# Patient Record
Sex: MALE | Race: WHITE | NOT HISPANIC OR LATINO | Employment: FULL TIME | URBAN - METROPOLITAN AREA
[De-identification: names, ages, dates, MRNs, and addresses within clinical notes are randomized per-mention and may not be internally consistent; named-entity substitution may affect disease eponyms.]

---

## 2022-05-31 ENCOUNTER — OFFICE VISIT (OUTPATIENT)
Dept: FAMILY MEDICINE | Facility: CLINIC | Age: 32
End: 2022-05-31
Payer: COMMERCIAL

## 2022-05-31 VITALS
TEMPERATURE: 98.6 F | BODY MASS INDEX: 24.38 KG/M2 | HEART RATE: 66 BPM | OXYGEN SATURATION: 98 % | SYSTOLIC BLOOD PRESSURE: 124 MMHG | RESPIRATION RATE: 16 BRPM | HEIGHT: 72 IN | WEIGHT: 180 LBS | DIASTOLIC BLOOD PRESSURE: 88 MMHG

## 2022-05-31 DIAGNOSIS — F51.3 SLEEP WALKING: Primary | ICD-10-CM

## 2022-05-31 PROCEDURE — 99203 OFFICE O/P NEW LOW 30 MIN: CPT | Performed by: PHYSICIAN ASSISTANT

## 2022-05-31 ASSESSMENT — PAIN SCALES - GENERAL: PAINLEVEL: NO PAIN (0)

## 2022-05-31 NOTE — PROGRESS NOTES
Assessment & Plan     Sleep walking  Ongoing since childhood, more frequent recently. Recommend follow-up with sleep clinic, referral placed.   - Adult Sleep Eval & Management  Referral; Future    He will return in a few weeks for physical. Declines COVID vaccine.    Return in about 3 months (around 8/31/2022) for Preventive Physical Exam.    TAVIA Hurley [facility] LOUISE Pritchard is a 31 year old who presents for the following health issues  accompanied by his partner.    Healthy Habits:     Taking medications regularly:  0    PHQ-2 Total Score: 0  History of Present Illness       Reason for visit:  Sleep  Symptom onset:  More than a month  Symptoms include:  Sleep walking talking  Symptom intensity:  Mild  Symptom progression:  Staying the same  Had these symptoms before:  Yes  Has tried/received treatment for these symptoms:  No    He eats 2-3 servings of fruits and vegetables daily.He consumes 3 sweetened beverage(s) daily.He exercises with enough effort to increase his heart rate 60 or more minutes per day.  He exercises with enough effort to increase his heart rate 5 days per week.   He is taking medications regularly.     New patient. No routine medical care for many years. Planning to establish care here.    Here with his partner today. She reports that Macho has a long history of sleep walking/sleep talking and it seems to be getting worse. He did sleep walk as a child as well and has had some ongoing sleep walking/talking as an adult. He has no recollection of any events. His partner says that he seems to be having more frequent episodes - sometimes even 3-4 episodes a night. She describes him startling awake, getting out of bed, and often talks about work but it doesn't make sense. Recently, he has pushed her away a few times when she tries to help him back to bed. He has never tried to leave the house or drive during an episode. He does snore but  doesn't ever stop breathing. He feels rested when he wakes up - denies fatigue.    He does not take any medications regularly. Social alcohol use - they have paid attention to see if symptoms correlate with times he drinks alcohol but there does not seem to be any association. No drug use.      Review of Systems   Constitutional, HEENT, cardiovascular, pulmonary, gi and gu systems are negative, except as otherwise noted.      Objective    /88 (BP Location: Right arm, Patient Position: Sitting, Cuff Size: Adult Regular)   Pulse 66   Temp 98.6  F (37  C) (Oral)   Resp 16   Ht 1.829 m (6')   Wt 81.6 kg (180 lb)   SpO2 98%   BMI 24.41 kg/m    Body mass index is 24.41 kg/m .  Physical Exam   GENERAL: healthy, alert and no distress  NECK: no adenopathy, no asymmetry, masses, or scars and thyroid normal to palpation  RESP: lungs clear to auscultation - no rales, rhonchi or wheezes  CV: regular rate and rhythm  NEURO: Normal strength and tone, mentation intact and speech normal  PSYCH: mentation appears normal, affect normal/bright

## 2022-06-22 ENCOUNTER — OFFICE VISIT (OUTPATIENT)
Dept: FAMILY MEDICINE | Facility: CLINIC | Age: 32
End: 2022-06-22
Payer: COMMERCIAL

## 2022-06-22 VITALS
WEIGHT: 180 LBS | SYSTOLIC BLOOD PRESSURE: 100 MMHG | TEMPERATURE: 98.4 F | RESPIRATION RATE: 16 BRPM | HEART RATE: 63 BPM | DIASTOLIC BLOOD PRESSURE: 68 MMHG | BODY MASS INDEX: 24.41 KG/M2

## 2022-06-22 DIAGNOSIS — Z11.4 SCREENING FOR HIV (HUMAN IMMUNODEFICIENCY VIRUS): ICD-10-CM

## 2022-06-22 DIAGNOSIS — Z11.59 NEED FOR HEPATITIS C SCREENING TEST: ICD-10-CM

## 2022-06-22 DIAGNOSIS — Z00.00 ROUTINE GENERAL MEDICAL EXAMINATION AT A HEALTH CARE FACILITY: ICD-10-CM

## 2022-06-22 LAB
ALBUMIN SERPL-MCNC: 4 G/DL (ref 3.4–5)
ALP SERPL-CCNC: 104 U/L (ref 40–150)
ALT SERPL W P-5'-P-CCNC: 101 U/L (ref 0–70)
ANION GAP SERPL CALCULATED.3IONS-SCNC: 4 MMOL/L (ref 3–14)
AST SERPL W P-5'-P-CCNC: 79 U/L (ref 0–45)
BILIRUB SERPL-MCNC: 0.4 MG/DL (ref 0.2–1.3)
BUN SERPL-MCNC: 12 MG/DL (ref 7–30)
CALCIUM SERPL-MCNC: 8.9 MG/DL (ref 8.5–10.1)
CHLORIDE BLD-SCNC: 108 MMOL/L (ref 94–109)
CHOLEST SERPL-MCNC: 188 MG/DL
CO2 SERPL-SCNC: 27 MMOL/L (ref 20–32)
CREAT SERPL-MCNC: 0.96 MG/DL (ref 0.66–1.25)
FASTING STATUS PATIENT QL REPORTED: YES
GFR SERPL CREATININE-BSD FRML MDRD: >90 ML/MIN/1.73M2
GLUCOSE BLD-MCNC: 98 MG/DL (ref 70–99)
HDLC SERPL-MCNC: 41 MG/DL
LDLC SERPL CALC-MCNC: 131 MG/DL
NONHDLC SERPL-MCNC: 147 MG/DL
POTASSIUM BLD-SCNC: 4 MMOL/L (ref 3.4–5.3)
PROT SERPL-MCNC: 7.4 G/DL (ref 6.8–8.8)
SODIUM SERPL-SCNC: 139 MMOL/L (ref 133–144)
TRIGL SERPL-MCNC: 82 MG/DL

## 2022-06-22 PROCEDURE — 36415 COLL VENOUS BLD VENIPUNCTURE: CPT | Performed by: PHYSICIAN ASSISTANT

## 2022-06-22 PROCEDURE — 80061 LIPID PANEL: CPT | Performed by: PHYSICIAN ASSISTANT

## 2022-06-22 PROCEDURE — 80053 COMPREHEN METABOLIC PANEL: CPT | Performed by: PHYSICIAN ASSISTANT

## 2022-06-22 PROCEDURE — 99395 PREV VISIT EST AGE 18-39: CPT | Performed by: PHYSICIAN ASSISTANT

## 2022-06-22 ASSESSMENT — PAIN SCALES - GENERAL: PAINLEVEL: NO PAIN (0)

## 2022-06-22 NOTE — LETTER
"June 23, 2022      Macho Ybarra  [address]  [place] [id number]        Dear ,    We are writing to inform you of your test results.    It was a pleasure to see you in clinic! Here are your recent labs.     Total cholesterol is normal, please continue exercise and watch diet. Triglycerides are normal, this is simple sugar and fat in blood. HDL which is the \"good\" cholesterol (heart protective) is normal, increase this with more exercise. The LDL or \"bad\" cholesterol is a little elevated but does not require medication at this time.     Your metabolic panel reveals normal kidney function and electrolytes. Your glucose is normal. A few of your liver tests are elevated. I would recommend rechecking these in about 3 months. I placed lab orders and you can schedule a lab visit at your convenience. You do not need to be fasting.     If you have any questions or concerns, please do not hesitate to contact me.       Resulted Orders   Lipid panel reflex to direct LDL Fasting   Result Value Ref Range    Cholesterol 188 <200 mg/dL    Triglycerides 82 <150 mg/dL    Direct Measure HDL 41 >=40 mg/dL    LDL Cholesterol Calculated 131 (H) <=100 mg/dL    Non HDL Cholesterol 147 (H) <130 mg/dL    Patient Fasting > 8hrs? Yes     Narrative    Cholesterol  Desirable:  <200 mg/dL    Triglycerides  Normal:  Less than 150 mg/dL  Borderline High:  150-199 mg/dL  High:  200-499 mg/dL  Very High:  Greater than or equal to 500 mg/dL    Direct Measure HDL  Female:  Greater than or equal to 50 mg/dL   Male:  Greater than or equal to 40 mg/dL    LDL Cholesterol  Desirable:  <100mg/dL  Above Desirable:  100-129 mg/dL   Borderline High:  130-159 mg/dL   High:  160-189 mg/dL   Very High:  >= 190 mg/dL    Non HDL Cholesterol  Desirable:  130 mg/dL  Above Desirable:  130-159 mg/dL  Borderline High:  160-189 mg/dL  High:  190-219 mg/dL  Very High:  Greater than or equal to 220 mg/dL   Comprehensive metabolic panel (BMP + Alb, Alk " Phos, ALT, AST, Total. Bili, TP)   Result Value Ref Range    Sodium 139 133 - 144 mmol/L    Potassium 4.0 3.4 - 5.3 mmol/L    Chloride 108 94 - 109 mmol/L    Carbon Dioxide (CO2) 27 20 - 32 mmol/L    Anion Gap 4 3 - 14 mmol/L    Urea Nitrogen 12 7 - 30 mg/dL    Creatinine 0.96 0.66 - 1.25 mg/dL    Calcium 8.9 8.5 - 10.1 mg/dL    Glucose 98 70 - 99 mg/dL    Alkaline Phosphatase 104 40 - 150 U/L    AST 79 (H) 0 - 45 U/L     (H) 0 - 70 U/L    Protein Total 7.4 6.8 - 8.8 g/dL    Albumin 4.0 3.4 - 5.0 g/dL    Bilirubin Total 0.4 0.2 - 1.3 mg/dL    GFR Estimate >90 >60 mL/min/1.73m2      Comment:      Effective December 21, 2021 eGFRcr in adults is calculated using the 2021 CKD-EPI creatinine equation which includes age and gender (Romulo et al., NEJM, DOI: 10.1056/MTHHzz9541334)       If you have any questions or concerns, please call the clinic at the number listed above.       Sincerely,      Belinda Le PA-C

## 2022-06-22 NOTE — PATIENT INSTRUCTIONS
Preventive Health Recommendations  Male Ages 26 - 39    Yearly exam:             See your health care provider every year in order to  o   Review health changes.   o   Discuss preventive care.    o   Review your medicines if your doctor has prescribed any.  You should be tested each year for STDs (sexually transmitted diseases), if you re at risk.   After age 35, talk to your provider about cholesterol testing. If you are at risk for heart disease, have your cholesterol tested at least every 5 years.   If you are at risk for diabetes, you should have a diabetes test (fasting glucose).  Shots: Get a flu shot each year. Get a tetanus shot every 10 years.     Nutrition:  Eat at least 5 servings of fruits and vegetables daily.   Eat whole-grain bread, whole-wheat pasta and brown rice instead of white grains and rice.   Get adequate Calcium and Vitamin D.     Lifestyle  Exercise for at least 150 minutes a week (30 minutes a day, 5 days a week). This will help you control your weight and prevent disease.   Limit alcohol to one drink per day.   No smoking.   Wear sunscreen to prevent skin cancer.   See your dentist every six months for an exam and cleaning.

## 2022-06-22 NOTE — PROGRESS NOTES
SUBJECTIVE:   CC: Macho Ybarra is an 31 year old male who presents for preventative health visit.     Patient has been advised of split billing requirements and indicates understanding: Yes  Healthy Habits:     Getting at least 3 servings of Calcium per day:  Yes    Bi-annual eye exam:  Yes    Dental care twice a year:  NO    Sleep apnea or symptoms of sleep apnea:  None    Diet:  Regular (no restrictions)    Frequency of exercise:  2-3 days/week    Duration of exercise:  Greater than 60 minutes    Taking medications regularly:  Yes    Medication side effects:  Not applicable    PHQ-2 Total Score: 0    Additional concerns today:  No    He has not had a physical in several years. He reports he is doing well. No concerns today.    He has had issues with sleep walking and has visit with sleep clinic scheduled for September.    Today's PHQ-2 Score:   PHQ-2 ( 1999 [employer]) 6/22/2022   Q1: Little interest or pleasure in doing things 0   Q2: Feeling down, depressed or hopeless 0   PHQ-2 Score 0   Q1: Little interest or pleasure in doing things Not at all   Q2: Feeling down, depressed or hopeless Not at all   PHQ-2 Score 0       Abuse: Current or Past(Physical, Sexual or Emotional)- No  Do you feel safe in your environment? Yes    Have you ever done Advance Care Planning? (For example, a Health Directive, POLST, or a discussion with a medical provider or your loved ones about your wishes): No, advance care planning information given to patient to review.  Patient plans to discuss their wishes with loved ones or provider.      Social History     Tobacco Use     Smoking status: Never Smoker     Smokeless tobacco: Current User     Types: Chew   Substance Use Topics     Alcohol use: Yes     Comment: Social     If you drink alcohol do you typically have >3 drinks per day or >7 drinks per week? No    Alcohol Use 6/22/2022   Prescreen: >3 drinks/day or >7 drinks/week? No   Prescreen: >3 drinks/day or >7 drinks/week? -       Last  PSA: No results found for: PSA    Reviewed orders with patient. Reviewed health maintenance and updated orders accordingly - Yes  Lab work is in process  BP Readings from Last 3 Encounters:   06/22/22 100/68   05/31/22 124/88    Wt Readings from Last 3 Encounters:   06/22/22 81.6 kg (180 lb)   05/31/22 81.6 kg (180 lb)                  There is no problem list on file for this patient.    History reviewed. No pertinent surgical history.    Social History     Tobacco Use     Smoking status: Never Smoker     Smokeless tobacco: Current User     Types: Chew   Substance Use Topics     Alcohol use: Yes     Comment: Social     Family History   Problem Relation Age of Onset     Hyperlipidemia Father      Diabetes Type 1 Brother      Colon Cancer No family hx of      Breast Cancer No family hx of      Prostate Cancer No family hx of          No current outpatient medications on file.     Allergies   Allergen Reactions     Seasonal Allergies      No lab results found.     Reviewed and updated as needed this visit by clinical staff   Tobacco  Allergies  Meds   Med Hx  Surg Hx  Fam Hx  Soc Hx          Reviewed and updated as needed this visit by Provider                       Review of Systems  CONSTITUTIONAL: NEGATIVE for fever, chills, change in weight  INTEGUMENTARY/SKIN: NEGATIVE for worrisome rashes, moles or lesions  EYES: NEGATIVE for vision changes or irritation  ENT: NEGATIVE for ear, mouth and throat problems  RESP: NEGATIVE for significant cough or SOB  CV: NEGATIVE for chest pain, palpitations or peripheral edema  GI: NEGATIVE for nausea, abdominal pain, heartburn, or change in bowel habits   male: negative for dysuria, hematuria, decreased urinary stream, erectile dysfunction, urethral discharge  MUSCULOSKELETAL: NEGATIVE for significant arthralgias or myalgia  NEURO: NEGATIVE for weakness, dizziness or paresthesias  PSYCHIATRIC: NEGATIVE for changes in mood or affect    OBJECTIVE:   /68 (BP Location:  Right arm, Patient Position: Sitting, Cuff Size: Adult Large)   Pulse 63   Temp 98.4  F (36.9  C) (Oral)   Resp 16   Wt 81.6 kg (180 lb)   BMI 24.41 kg/m      Physical Exam  GENERAL: healthy, alert and no distress  EYES: Eyes grossly normal to inspection, PERRL and conjunctivae and sclerae normal  HENT: ear canals and TM's normal, nose and mouth without ulcers or lesions, several cracked/missing teeth, crowns have fallen off  NECK: no adenopathy, no asymmetry, masses, or scars and thyroid normal to palpation  RESP: lungs clear to auscultation - no rales, rhonchi or wheezes  CV: regular rate and rhythm, normal S1 S2, no S3 or S4, no murmur, click or rub, no peripheral edema and peripheral pulses strong  ABDOMEN: soft, nontender, no hepatosplenomegaly, no masses and bowel sounds normal   (male): patient declined  MS: no gross musculoskeletal defects noted, no edema  SKIN: no suspicious lesions or rashes  NEURO: Normal strength and tone, mentation intact and speech normal  PSYCH: mentation appears normal, affect normal/bright    Diagnostic Test Results:  Labs reviewed in Epic    ASSESSMENT/PLAN:   Routine general medical examination at a health care facility  Reviewed personal and family history. Reviewed age appropriate screenings. Reviewed healthy BP and BMI ranges. Counseled on lifestyle modifications for optimal mental and physical health.  Discussed age-appropriate health maintanence. Recommended any needed vaccinations. Continue to focus on well balanced diet and exercise. Declines COVID vaccine.  - Lipid panel reflex to direct LDL Fasting; Future  - Comprehensive metabolic panel (BMP + Alb, Alk Phos, ALT, AST, Total. Bili, TP); Future  - Lipid panel reflex to direct LDL Fasting  - Comprehensive metabolic panel (BMP + Alb, Alk Phos, ALT, AST, Total. Bili, TP)    Screening for HIV (human immunodeficiency virus)  Declined screening    Need for hepatitis C screening test  Declined screening    COUNSELING:    Reviewed preventive health counseling, as reflected in patient instructions    Estimated body mass index is 24.41 kg/m  as calculated from the following:    Height as of 5/31/22: 1.829 m (6').    Weight as of this encounter: 81.6 kg (180 lb).         He reports that he has never smoked. His smokeless tobacco use includes chew.      Counseling Resources:  ATP IV Guidelines  Pooled Cohorts Equation Calculator  FRAX Risk Assessment  ICSI Preventive Guidelines  Dietary Guidelines for Americans, 2010  USDA's MyPlate  ASA Prophylaxis  Lung CA Screening    Belinda Le PA-C  [facility]

## 2022-06-23 DIAGNOSIS — R74.8 ELEVATED LIVER ENZYMES: Primary | ICD-10-CM

## 2022-09-19 ENCOUNTER — VIRTUAL VISIT (OUTPATIENT)
Dept: SLEEP MEDICINE | Facility: CLINIC | Age: 32
End: 2022-09-19
Attending: PHYSICIAN ASSISTANT
Payer: COMMERCIAL

## 2022-09-19 VITALS — BODY MASS INDEX: 24.92 KG/M2 | WEIGHT: 184 LBS | HEIGHT: 72 IN

## 2022-09-19 DIAGNOSIS — G47.50 PARASOMNIA, UNSPECIFIED TYPE: ICD-10-CM

## 2022-09-19 DIAGNOSIS — F51.3 SLEEP WALKING: Primary | ICD-10-CM

## 2022-09-19 PROCEDURE — 99244 OFF/OP CNSLTJ NEW/EST MOD 40: CPT | Mod: 95 | Performed by: PHYSICIAN ASSISTANT

## 2022-09-19 RX ORDER — CLONAZEPAM 0.5 MG/1
0.5 TABLET ORAL
Qty: 30 TABLET | Refills: 0 | Status: SHIPPED | OUTPATIENT
Start: 2022-09-19

## 2022-09-19 ASSESSMENT — SLEEP AND FATIGUE QUESTIONNAIRES
HOW LIKELY ARE YOU TO NOD OFF OR FALL ASLEEP WHILE SITTING INACTIVE IN A PUBLIC PLACE: WOULD NEVER DOZE
HOW LIKELY ARE YOU TO NOD OFF OR FALL ASLEEP WHEN YOU ARE A PASSENGER IN A CAR FOR AN HOUR WITHOUT A BREAK: WOULD NEVER DOZE
HOW LIKELY ARE YOU TO NOD OFF OR FALL ASLEEP WHILE WATCHING TV: SLIGHT CHANCE OF DOZING
HOW LIKELY ARE YOU TO NOD OFF OR FALL ASLEEP IN A CAR, WHILE STOPPED FOR A FEW MINUTES IN TRAFFIC: WOULD NEVER DOZE
HOW LIKELY ARE YOU TO NOD OFF OR FALL ASLEEP WHILE SITTING AND TALKING TO SOMEONE: WOULD NEVER DOZE
HOW LIKELY ARE YOU TO NOD OFF OR FALL ASLEEP WHILE LYING DOWN TO REST IN THE AFTERNOON WHEN CIRCUMSTANCES PERMIT: MODERATE CHANCE OF DOZING
HOW LIKELY ARE YOU TO NOD OFF OR FALL ASLEEP WHILE SITTING QUIETLY AFTER LUNCH WITHOUT ALCOHOL: WOULD NEVER DOZE
HOW LIKELY ARE YOU TO NOD OFF OR FALL ASLEEP WHILE SITTING AND READING: MODERATE CHANCE OF DOZING

## 2022-09-19 ASSESSMENT — PAIN SCALES - GENERAL: PAINLEVEL: NO PAIN (0)

## 2022-09-19 NOTE — PATIENT INSTRUCTIONS
Make sure you are getting enough sleep. Aim for 7.5-8 hours in bed nightly. Insufficient sleep is one of the most common causes of sleep walking and related other behaviors in sleep.   Try to limit caffeine to no more than 3 servings, no later than 3 PM.  Make sure your room is free of clutter so you don't trip on something. Consider putting a lock on the door so you can't leave your room.

## 2022-09-19 NOTE — PROGRESS NOTES
Outpatient Sleep Medicine Consultation:      Name: Macho Ybarra MRN# [id number]   Age: 31 year old YOB: 1990     Date of Consultation: September 18, 2022  Consultation is requested by: Belinda Le PA-C  [address] [place] [address] Belinda Le  Primary care provider: Belinda Le       Reason for Sleep Consult:     Macho Ybarra is sent by Belinda Le for a sleep consultation regarding sleep walking.    Patient s Reason for visit  Macho Ybarra main reason for visit:  Sleep walking and talking  Patient states problem(s) started:  Since childhood  Macho Ybarra's goals for this visit:             Assessment and Plan:     Summary Sleep Diagnoses and Recommendations:  (F51.3) Sleep walking  (primary encounter diagnosis), (G47.50) Parasomnia, unspecified type  Comment: Macho presents with sleep walking and other parasomnia behaviors. He has had sleep walking since childhood and once walked to a neighbors house. Recently, his wife reports he has some sort of behavior in his sleep nightly. He will sometimes get up and turn all of the lights on if there is not a night-light on in the room. He will have mild events where his wife will be able to redirect him back to bed easily. He sometimes has more significant events where he darts out of bed confused and panicked and he can be rather combative with his wife for 30-45 minutes as she is trying to prevent him from leaving the room. She has observed him to kick in his sleep like he is running. These events can occur anywhere between shortly after bedtime and 4 AM. He has no recollection of these events at all. He does not have symptoms of apnea or RLS. He denies daytime sleepiness despite only getting 6-7 hours of sleep per night. He does sometimes have a little trouble initiating sleep, maybe once a week. He has about 6 cans of pop per day, the last around 9 PM. He has alcohol about once per week. He has cut out alcohol and  "caffeine in the past without a change in his sleep behaviors. He denies any other substances and is not on any prescription medications. His behaviors do not seem to be stereotypical and he denies enuresis or cheek/tongue biting.     Plan: Comprehensive Sleep Study, Drug abuse screen 77 urine ([place] only)        In lab PSG with seizure montage. U-Tox screen on the night of the test. He was encouraged to bring a night light with him.  I encouraged him to try to get more sleep, aim for 7.5-8 hours per night. Reduce caffeine to no more than 3 servings no later than 3 PM. He was advised to make sure there is no clutter on the floor that could lead to injury. Consider putting a lock on the door to prevent leaving the house or a motion sensitive alarm.   Given there seems to be elevated risk of him hurting himself or his wife, I did prescribe clonazepam 0.5 mg to treat his behaviors until we can get a sleep study. He was encourage to start with 0.25 mg for a week and then go up to 0.5 mg if needed. For a week prior to the study, reduce to 0.25 mg, and then stop for the night of the test.   We will send him a controlled substance agreement.     Comorbid Diagnoses:  chewing tobacco use    Summary Counseling:    Sleep Testing Reviewed  Obstructive Sleep Apnea Reviewed  Complications of Untreated Sleep Apnea Reviewed      Patient will follow up 2 weeks after sleep study.  Bennett Goltz, PA-C      Total time spent reviewing medical records, history and physical examination, review of previous testing and interpretation as well as documentation on this date: 72 min    CC: Belinda Le          History of Present Illness:     The following is an excerpt from his visit with his primary on 5/31/22:  \"Macho has a long history of sleep walking/sleep talking and it seems to be getting worse. He did sleep walk as a child as well and has had some ongoing sleep walking/talking as an adult. He has no recollection of any " "events. His partner says that he seems to be having more frequent episodes - sometimes even 3-4 episodes a night. She describes him startling awake, getting out of bed, and often talks about work but it doesn't make sense. Recently, he has pushed her away a few times when she tries to help him back to bed. He has never tried to leave the house or drive during an episode. He does snore but doesn't ever stop breathing. He feels rested when he wakes up - denies fatigue.  He does not take any medications regularly. Social alcohol use - they have paid attention to see if symptoms correlate with times he drinks alcohol but there does not seem to be any association. No drug use.\"    He has some difficulty falling asleep. He startles awake and jumps out of bed really fast. He will sometimes go to the living room. His mom said he ended up [place] the neighbors [place] once [place]). His wife would try to calm him and he would think someone was trying to hurt him. It can take 30-45 minutes to get him calm and back to sleep.  She had to wake him fully to get him to calm down and go back to sleep. He has some sort of movement or talking or startle every night. The timing can vary from early in the night to 4 AM.     Past Sleep Evaluations: no    SLEEP-WAKE SCHEDULE:     Work/School Days: Patient goes to school/work:     Usually gets into bed at 10-11 PM   Takes patient about 5-30 min  to fall asleep  Has trouble falling asleep 1  nights per week  Wakes up in the middle of the night 0  times.  Wakes up due to  n/a  Patient is usually up at 5:30-6 AM.   Uses alarm:  yes    Weekends/Non-work Days/All Other Days:  His sleep schedule is the same. 1-2 times per month he may sleep in until 8 AM.     Sleep Need  Patient gets 6-7 hrs   sleep on average   Patient thinks he needs about 6-7 hrs sleep    Macho KALANI Tate prefers to sleep in this position(s):   Stomach and sides  Patient states they do the " following activities in bed:  No reading, TV or electronics in bed    Naps  Patient takes a purposeful nap almost never .  He feels better after a nap:    He dozes off unintentionally 0  days per week  Patient has had a driving accident or near-miss due to sleepiness/drowsiness:  no      SLEEP DISRUPTIONS:    Breathing/Snoring  Patient snores: yes, mild  Other people complain about his snoring:  no  Patient has been told he stops breathing in his sleep:  no  He has issues with the following:  Denies waking with a headache, nocturnal reflux or heartburn, difficulty breathing through nose (wife says he breathes through his mouth though). Denies dry mouth in the morning.    Movement:  Patient gets pain, discomfort, with an urge to move:   no  It happens when he is resting:     It happens more at night:     Patient has been told he kicks his legs at night:  Yes- sometimes seems like he is trying to run in his sleep.      Behaviors in Sleep:  Macho Ybarra has experienced the following behaviors while sleeping:  sleep talking, sleep walking. He does not remember dream. He can get combative when he wakes startled. Sometimes it works to calmly redirect him back to bed but when he wakes startled, he does not respond to that. His denies anosmia. Sometimes he says he is hot in the middle of the night (wife says he is not sweating and does not feel hot).  He has to sleep with a night light. If there is not enough light, he will run around the house and turn on all of the lights. The events are not stereotypical.  Pt denies bruxism. Pt denies sleep paralysis, hypnagogue and cataplexy.   He has not had loss of continence or tongue/cheek biting.    Is there anything else you would like your sleep provider to know:      CAFFEINE AND OTHER SUBSTANCES:    Patient consumes caffeinated beverages per day:   6 sodas per day. Cutting out caffeine did not seem to help, but he was more grumpy and had a headache.  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   He lives with his wife. He lives with his wife and 2 kids (6 and 9).       SCALES:    EPWORTH SLEEPINESS SCALE      [place] Sleepiness Scale ( ANNABEL Wheeler  1990-1997[facility] [place]/English - Final version - 21 Nov 07 - [facility] [place].) 9/19/2022   Sitting and reading Moderate chance of dozing   Watching TV Slight chance of dozing   Sitting, inactive in a public place (e.g. a theatre or a meeting) Would never doze   As a passenger in a car for an hour without a break Would never doze   Lying down to rest in the afternoon when circumstances permit Moderate chance of dozing   Sitting and talking to someone Would never doze   Sitting quietly after a lunch without alcohol Would never doze   In a car, while stopped for a few minutes in traffic Would never doze   [place] Score (MC) 5   [place] Score (Sleep) 5         INSOMNIA SEVERITY INDEX (WALTER)      Insomnia Severity Index (WALTER) 9/19/2022   Difficulty falling asleep 2   Difficulty staying asleep 0   Problems waking up too early 0   How SATISFIED/DISSATISFIED are you with your CURRENT sleep pattern? 2   How NOTICEABLE to others do you think your sleep problem is in terms of impairing the quality of your life? 0   How WORRIED/DISTRESSED are you about your current sleep problem? 2   To what extent do you consider your sleep problem to INTERFERE with your daily functioning (e.g. daytime fatigue, mood, ability to function at work/daily chores,  "concentration, memory, mood, etc.) CURRENTLY? 4   WALTER Total Score 10       Guidelines for Scoring/Interpretation:  Total score categories:  0-7 = No clinically significant insomnia   8-14 = Subthreshold insomnia   15-21 = Clinical insomnia (moderate severity)  22-28 = Clinical insomnia (severe)  Used via courtesy of www.Referrizerealth.[place].gov with permission from Frank Syed PhD., [facility]      STOP BANG     STOP BANG Questionnaire (  2008, the American Society of Anesthesiologists, Inc. [employer] 9/19/2022   B/P Clinic: -   BMI Clinic: 24.95         GAD7    No flowsheet data found.      CAGE-AID    No flowsheet data found.    CAGE-AID reprinted with permission from the [place] Medical Journal, KAYY Mendez and GRETA Reed, \"Conjoint screening questionnaires for alcohol and drug abuse\" [place] Medical Journal 94: 135-140, 1995.      PATIENT HEALTH QUESTIONNAIRE-9 (PHQ - 9)    No flowsheet data found.    Developed by Rei Nunez, Saloni Enriquez, Kennedy Hoyt and colleagues, with an educational harriet from [employer] No permission required to reproduce, translate, display or distribute.        Allergies:    Allergies   Allergen Reactions     Seasonal Allergies        Medications:    No current outpatient medications on file.       Problem List:  There are no problems to display for this patient.       Past Medical/Surgical History:  History reviewed. No pertinent past medical history.  History reviewed. No pertinent surgical history.    Social History:  Social History     Socioeconomic History     Marital status:      Spouse name: Not on file     Number of children: Not on file     Years of education: Not on file     Highest education level: Not on file   Occupational History     Not on file   Tobacco Use     Smoking status: Never Smoker     Smokeless tobacco: Current User     Types: Chew   Vaping Use     Vaping Use: Never used   Substance and Sexual Activity "     Alcohol use: Yes     Comment: Social, once a week     Drug use: Never     Sexual activity: Yes     Partners: Female   Other Topics Concern     Not on file   Social History Narrative     Not on file     Social Determinants of Health     Financial Resource Strain: Not on file   Food Insecurity: Not on file   Transportation Needs: Not on file   Physical Activity: Not on file   Stress: Not on file   Social Connections: Not on file   Intimate Partner Violence: Not on file   Housing Stability: Not on file       Family History:  Family History   Problem Relation Age of Onset     Hyperlipidemia Father      Diabetes Type 1 Brother      Colon Cancer No family hx of      Breast Cancer No family hx of      Prostate Cancer No family hx of        Review of Systems:  A complete review of systems reviewed by me is negative with the exeption of what has been mentioned in the history of present illness.    Review Of Systems  General: Negative for fevers, chills, night sweats   Eyes: negative for, visual blurring, double vision  Ears/Nose/Throat: positive for occasional allergy symptoms of nasal congestion, purulent rhinorrhea, postnasal drainage. Also history of epistaxis, not recently though  Respiratory: No shortness of breath, dyspnea on exertion, cough, wheeze or hemoptysis  Cardiovascular: negative for, tachycardia, irregular heart beat, chest pain, orthopnea and lower extremity edema  Genitourinary: negative for nocturia  Musculoskeletal: negative for, joint pain and joint swelling  Neurologic: negative for, headaches and numbness or tingling of extremities  Psychiatric: negative for, anxiety and depression      Physical Examination:  Vitals: Ht 1.829 m (6')   Wt 83.5 kg (184 lb)   BMI 24.95 kg/m           GENERAL APPEARANCE: healthy, alert, no distress and cooperative  EYES: Eyes grossly normal to inspection  HENT: Difficult to visualize well on the video, he could not open his mouth very far to get a good look. Tongue  seemed large for his mouth.  NECK: no asymmetry, masses, or scars  RESP: no respiratory distress, cough or wheeze  Mallampati Class: IV.           Data: All pertinent previous laboratory data reviewed     Recent Labs   Lab Test 06/22/22  1040      POTASSIUM 4.0   CHLORIDE 108   CO2 27   ANIONGAP 4   GLC 98   BUN 12   CR 0.96   ZARINA 8.9       No results for input(s): WBC, RBC, HGB, HCT, MCV, MCH, MCHC, RDW, PLT in the last 17643 hours.    Recent Labs   Lab Test 06/22/22  1040   PROTTOTAL 7.4   ALBUMIN 4.0   BILITOTAL 0.4   ALKPHOS 104   AST 79*   *       No results found for: TSH    No results found for: UAMP, UBARB, BENZODIAZEUR, UCANN, UCOC, OPIT, UPCP    No results found for: IRONSAT, [id number], MARLA    No results found for: PH, PHARTERIAL, PO2, OV3QIVPEHTV, SAT, PCO2, HCO3, BASEEXCESS, SONALI, BEB    @LABRCNTIPR(phv:4,pco2v:4,po2v:4,hco3v:4,cipriano:4,o2per:4)@    Echocardiology: No results found for this or any previous visit (from the past 4320 hour(s)).    Chest x-ray: No results found for this or any previous visit from the past 365 days.      Chest CT: No results found for this or any previous visit from the past 365 days.      PFT: Most Recent [facility] Pulmonary Function Testing    No results found for: 200[address]      Bennett Ezra Goltz, PA-C, [facility] 9/18/2022

## 2022-09-19 NOTE — PROGRESS NOTES
Macho is a 31 year old who is being evaluated via a billable video visit.      How would you like to obtain your AVS? MyChart  If the video visit is dropped, the invitation should be resent by: Text to cell phone: [phone]  Will anyone else be joining your video visit?    Yes- Wife will be joining   Me     Video-Visit Details    Video Start Time: 3:41 PM    Type of service:  Video Visit    Video End Time:4:33 PM    Originating Location (pt. Location): Home    Distant Location (provider location):  [facility]     Platform used for Video Visit: [employer]

## 2022-10-03 ENCOUNTER — HEALTH MAINTENANCE LETTER (OUTPATIENT)
Age: 32
End: 2022-10-03

## 2023-03-17 ENCOUNTER — LAB (OUTPATIENT)
Dept: LAB | Facility: CLINIC | Age: 33
End: 2023-03-17
Payer: COMMERCIAL

## 2023-03-17 DIAGNOSIS — R74.8 ELEVATED LIVER ENZYMES: ICD-10-CM

## 2023-03-17 LAB
ALBUMIN SERPL BCG-MCNC: 4.6 G/DL (ref 3.5–5.2)
ALP SERPL-CCNC: 102 U/L (ref 40–129)
ALT SERPL W P-5'-P-CCNC: 54 U/L (ref 10–50)
AST SERPL W P-5'-P-CCNC: 38 U/L (ref 10–50)
BILIRUB DIRECT SERPL-MCNC: <0.2 MG/DL (ref 0–0.3)
BILIRUB SERPL-MCNC: 0.5 MG/DL
PROT SERPL-MCNC: 7.4 G/DL (ref 6.4–8.3)

## 2023-03-17 PROCEDURE — 36415 COLL VENOUS BLD VENIPUNCTURE: CPT

## 2023-03-17 PROCEDURE — 80076 HEPATIC FUNCTION PANEL: CPT

## 2023-05-23 ENCOUNTER — PATIENT OUTREACH (OUTPATIENT)
Dept: CARE COORDINATION | Facility: CLINIC | Age: 33
End: 2023-05-23
Payer: COMMERCIAL

## 2023-06-07 ENCOUNTER — PATIENT OUTREACH (OUTPATIENT)
Dept: CARE COORDINATION | Facility: CLINIC | Age: 33
End: 2023-06-07
Payer: COMMERCIAL

## 2023-08-13 ENCOUNTER — HEALTH MAINTENANCE LETTER (OUTPATIENT)
Age: 33
End: 2023-08-13

## 2024-10-06 ENCOUNTER — HEALTH MAINTENANCE LETTER (OUTPATIENT)
Age: 34
End: 2024-10-06